# Patient Record
Sex: FEMALE | Race: WHITE | NOT HISPANIC OR LATINO | Employment: OTHER | URBAN - METROPOLITAN AREA
[De-identification: names, ages, dates, MRNs, and addresses within clinical notes are randomized per-mention and may not be internally consistent; named-entity substitution may affect disease eponyms.]

---

## 2024-04-24 ENCOUNTER — TELEPHONE (OUTPATIENT)
Age: 64
End: 2024-04-24

## 2024-04-24 NOTE — TELEPHONE ENCOUNTER
"Patient called today to establish care in [place] for bilateral kidney stones.     Patient states she was seen in ED in [facility]. Says that they are \"small and non obstructing\".    Pt asks if the office can reach out to her PCP Dr. Obrien tel number [phone]  to request records pertaining to kidney stones.    Please review for scheduling recommendations.    Patient states that she's experiencing pain for the last 2 weeks.     PCP gave her the ED precautions according to the patient.     Patient has a history of kidney stones.    Call back [phone]      "

## 2024-08-05 ENCOUNTER — OFFICE VISIT (OUTPATIENT)
Dept: FAMILY MEDICINE CLINIC | Facility: CLINIC | Age: 64
End: 2024-08-05
Payer: COMMERCIAL

## 2024-08-05 VITALS
HEIGHT: 69 IN | DIASTOLIC BLOOD PRESSURE: 80 MMHG | BODY MASS INDEX: 18.96 KG/M2 | HEART RATE: 72 BPM | OXYGEN SATURATION: 99 % | TEMPERATURE: 97.9 F | SYSTOLIC BLOOD PRESSURE: 134 MMHG | WEIGHT: 128 LBS

## 2024-08-05 DIAGNOSIS — G89.29 CHRONIC RIGHT-SIDED LOW BACK PAIN, UNSPECIFIED WHETHER SCIATICA PRESENT: ICD-10-CM

## 2024-08-05 DIAGNOSIS — F41.9 ANXIETY: ICD-10-CM

## 2024-08-05 DIAGNOSIS — M41.9 SCOLIOSIS OF LUMBOSACRAL SPINE, UNSPECIFIED SCOLIOSIS TYPE: ICD-10-CM

## 2024-08-05 DIAGNOSIS — K21.9 GASTROESOPHAGEAL REFLUX DISEASE WITHOUT ESOPHAGITIS: Primary | ICD-10-CM

## 2024-08-05 DIAGNOSIS — Z00.00 MEDICARE ANNUAL WELLNESS VISIT, SUBSEQUENT: ICD-10-CM

## 2024-08-05 DIAGNOSIS — M41.9 SCOLIOSIS OF THORACIC SPINE, UNSPECIFIED SCOLIOSIS TYPE: ICD-10-CM

## 2024-08-05 DIAGNOSIS — R92.30 DENSE BREAST TISSUE: ICD-10-CM

## 2024-08-05 DIAGNOSIS — Z78.0 ASYMPTOMATIC MENOPAUSE: ICD-10-CM

## 2024-08-05 DIAGNOSIS — Z13.220 SCREENING FOR LIPID DISORDERS: ICD-10-CM

## 2024-08-05 DIAGNOSIS — Z00.00 MEDICARE ANNUAL WELLNESS VISIT, INITIAL: ICD-10-CM

## 2024-08-05 DIAGNOSIS — N18.5 CHRONIC RENAL DISEASE, STAGE V (HCC): ICD-10-CM

## 2024-08-05 DIAGNOSIS — E55.9 VITAMIN D DEFICIENCY: ICD-10-CM

## 2024-08-05 DIAGNOSIS — K58.8 OTHER IRRITABLE BOWEL SYNDROME: ICD-10-CM

## 2024-08-05 DIAGNOSIS — M05.79 RHEUMATOID ARTHRITIS INVOLVING MULTIPLE SITES WITH POSITIVE RHEUMATOID FACTOR (HCC): ICD-10-CM

## 2024-08-05 DIAGNOSIS — R73.01 ELEVATED FASTING GLUCOSE: ICD-10-CM

## 2024-08-05 DIAGNOSIS — R53.82 CHRONIC FATIGUE: ICD-10-CM

## 2024-08-05 DIAGNOSIS — M54.50 CHRONIC RIGHT-SIDED LOW BACK PAIN, UNSPECIFIED WHETHER SCIATICA PRESENT: ICD-10-CM

## 2024-08-05 DIAGNOSIS — F17.200 SMOKER: ICD-10-CM

## 2024-08-05 DIAGNOSIS — K59.09 OTHER CONSTIPATION: ICD-10-CM

## 2024-08-05 PROBLEM — A60.09 HERPES GENITALIS IN WOMEN: Status: ACTIVE | Noted: 2020-03-02

## 2024-08-05 PROBLEM — N20.0 KIDNEY STONES: Status: ACTIVE | Noted: 2024-04-23

## 2024-08-05 PROBLEM — Z59.41 FOOD INSECURITY: Status: ACTIVE | Noted: 2024-05-13

## 2024-08-05 PROCEDURE — 99204 OFFICE O/P NEW MOD 45 MIN: CPT | Performed by: NURSE PRACTITIONER

## 2024-08-05 PROCEDURE — G0444 DEPRESSION SCREEN ANNUAL: HCPCS | Performed by: NURSE PRACTITIONER

## 2024-08-05 PROCEDURE — G0402 INITIAL PREVENTIVE EXAM: HCPCS | Performed by: NURSE PRACTITIONER

## 2024-08-05 NOTE — PROGRESS NOTES
Ambulatory Visit  Name: Joseph Mcbride      : 1960      MRN: [id number]  Encounter Provider: ROBER Cordero  Encounter Date: 2024   Encounter [facility]    Assessment & Plan   1. Gastroesophageal reflux disease without esophagitis  -     Comprehensive metabolic panel  -     CBC and differential  -     TSH, 3rd generation with Free T4 reflex  2. Chronic right-sided low back pain, unspecified whether sciatica present  -     Comprehensive metabolic panel  -     CBC and differential  -     TSH, 3rd generation with Free T4 reflex  -     Ambulatory referral to Spine & Pain Management; Future  -     Ambulatory Referral to Physical Therapy; Future  3. Anxiety  4. Medicare annual wellness visit, initial  5. Elevated fasting glucose  -     Lipid panel  -     UA/M w/rflx Culture, Routine  -     Hemoglobin A1C  6. Vitamin D deficiency  -     Vitamin D 25 hydroxy  7. Rheumatoid arthritis involving multiple sites with positive rheumatoid factor (HCC)  -     Ambulatory Referral to Physical Therapy; Future  8. Chronic renal disease, stage V (HCC)  -     Lipid panel  9. Screening for lipid disorders  -     Lipid panel  10. Chronic fatigue  -     TSH, 3rd generation with Free T4 reflex  11. Medicare annual wellness visit, subsequent  12. Dense breast tissue  -     Mammo diagnostic bilateral w cad; Future  13. Smoker  14. Asymptomatic menopause  -     DXA body comp analysis; Future; Expected date: 2024  15. Other irritable bowel syndrome  -     DXA body comp analysis; Future; Expected date: 2024  16. Other constipation  17. Scoliosis of lumbosacral spine, unspecified scoliosis type  -     Ambulatory Referral to Physical Therapy; Future  18. Scoliosis of thoracic spine, unspecified scoliosis type  -     Ambulatory Referral to Physical Therapy; Future      Depression Screening and Follow-up Plan: Patient was screened for depression during today's encounter. They  screened negative with a PHQ-2 score of 2.    Falls Plan of Care: balance, strength, and gait training instructions were provided. Recommended assistive device to help with gait and balance. Medications that increase falls were reviewed. Assessed feet and footwear. Vitamin D supplementation was recommended. Home safety education provided.     Tobacco Cessation Counseling: Tobacco cessation counseling was provided. The patient is sincerely urged to quit consumption of tobacco. She is not ready to quit tobacco. Medication options and side effects of medication discussed.       Preventive health issues were discussed with patient, and age appropriate screening tests were ordered as noted in patient's After Visit Summary. Personalized health advice and appropriate referrals for health education or preventive services given if needed, as noted in patient's After Visit Summary.    History of Present Illness     Pt is a 64 yr old female   Presents in office to establish care and follow up on multiple underlying issues   Also due for medicare wellness   Reviewed all diagnoses and medications   Patient verbalizes that she is very much a  and not much for medications   Would like to monitor underlying issues but in term of treatment would not like [facility] first unless really needed.   Discussed preventative medicine - not much for that either   Would like some labs ordered and will discuss further treatment options if needed   Does have back issues and scoliosis            Patient Care Team:  ROBER Cordero as PCP - General (Family Medicine)  Jarrett Campos DO (Gastroenterology)    Review of Systems   Constitutional:  Negative for fatigue, fever and unexpected weight change.   HENT:  Negative for congestion, rhinorrhea, sore throat and voice change.    Eyes: Negative.    Respiratory:  Negative for cough and shortness of breath.    Cardiovascular:  Negative for chest pain and palpitations.    Gastrointestinal:  Negative for abdominal distention, abdominal pain, nausea and vomiting.   Endocrine: Negative.    Genitourinary:  Negative for difficulty urinating and flank pain.   Musculoskeletal:  Positive for arthralgias, back pain and myalgias.        Scoliosis and upper back issues    Skin:  Negative for rash.   Allergic/Immunologic: Negative for environmental allergies.   Neurological:  Negative for headaches.   Hematological:  Negative for adenopathy.   Psychiatric/Behavioral:  Negative for sleep disturbance and suicidal ideas. The patient is not nervous/anxious.      Medical History Reviewed by provider this encounter:  Tobacco  Allergies  Meds  Problems  Med Hx  Surg Hx  Fam Hx       Annual Wellness Visit Questionnaire   Joseph is here for her Subsequent Wellness visit.     Health Risk Assessment:   Patient rates overall health as poor. Patient feels that their physical health rating is slightly worse. Patient is satisfied with their life. Eyesight was rated as slightly worse. Hearing was rated as same. Patient feels that their emotional and mental health rating is same. Patients states they are never, rarely angry. Patient states they are often unusually tired/fatigued. Pain experienced in the last 7 days has been a lot. Patient's pain rating has been 8/10. Patient states that she has experienced no weight loss or gain in last 6 months.     Depression Screening:   PHQ-2 Score: 2      Fall Risk Screening:   In the past year, patient has experienced: no history of falling in past year      Urinary Incontinence Screening:   Patient has leaked urine accidently in the last six months.     Home Safety:  Patient has trouble with stairs inside or outside of their home. Patient has working smoke alarms and has working carbon monoxide detector. Home safety hazards include: none.     Nutrition:   Current diet is Other (please comment). IBS causing restrictions to her eating. She does try to stay plantr  based     Medications:   Patient is not currently taking any over-the-counter supplements. Patient is able to manage medications.     Activities of Daily Living (ADLs)/Instrumental Activities of Daily Living (IADLs):   Walk and transfer into and out of bed and chair?: Yes  Dress and groom yourself?: Yes    Bathe or shower yourself?: Yes    Feed yourself? Yes  Do your laundry/housekeeping?: Yes  Manage your money, pay your bills and track your expenses?: Yes  Make your own meals?: Yes    Do your own shopping?: Yes    Previous Hospitalizations:   Any hospitalizations or ED visits within the last 12 months?: Yes    How many hospitalizations have you had in the last year?: 1-2    Advance Care Planning:   Living will: Yes    Advanced directive: Yes    Advanced directive counseling given: Yes    ACP document given: Yes      Cognitive Screening:   Provider or family/friend/caregiver concerned regarding cognition?: No    PREVENTIVE SCREENINGS      Cardiovascular Screening:    General: Risks and Benefits Discussed    Due for: Lipid Panel      Diabetes Screening:     General: Screening Current      Colorectal Cancer Screening:     General: Screening Current      Breast Cancer Screening:     General: Risks and Benefits Discussed    Due for: Mammogram        Cervical Cancer Screening:    General: Screening Not Indicated and Risks and Benefits Discussed      Osteoporosis Screening:    General: Risks and Benefits Discussed    Due for: Bone Density CT Axial      Lung Cancer Screening:     General: Screening Not Indicated    Screening, Brief Intervention, and Referral to Treatment (SBIRT)    Screening  Typical number of drinks in a day: 0  Typical number of drinks in a week: 0  Interpretation: Low risk drinking behavior.    Single Item Drug Screening:  How often have you used an illegal drug (including marijuana) or a prescription medication for non-medical reasons in the past year? never    Single Item Drug Screen Score:  0  Interpretation: Negative screen for possible drug use disorder    Time Spent  Time spent screening/evaluating the patient for alcohol misuse: 0 minutes. Time spent providing alcohol/substance abuse assessment and intervention services: 0 minutes.    Annual Depression Screening  Time spent screening and evaluating the patient for depression during today's encounter was 5 minutes.    SDOH Risk Assessment  Social determinants of health (SDOH) risk assesment tool was completed. The tool at a minimum covered housing stability, food insecurity, transportation needs, and utility difficulty. Patient had at risk responses for the following SDOH domains: housing stability.     Other Counseling Topics:   Car/seat belt/driving safety, skin self-exam, sunscreen and calcium and vitamin D intake and regular weightbearing exercise.     Social Determinants of Health     Financial Resource Strain: High Risk (4/22/2024)    Received from [employer]    Financial Resource Strain     Do you have any trouble paying for your medications, or do you think you might in the future?: Yes   Food Insecurity: Patient Declined (8/5/2024)    Hunger Vital Sign     Worried About Running Out of Food in the Last Year: Patient declined     Ran Out of Food in the Last Year: Patient declined   Transportation Needs: Patient Declined (8/5/2024)    PRAPARE - Transportation     Lack of Transportation (Medical): Patient declined     Lack of Transportation (Non-Medical): Patient declined   Housing Stability: Patient Declined (8/5/2024)    Housing Stability Vital Sign     Unable to Pay for Housing in the Last Year: Patient declined     Number of Times Moved in the Last Year: 2     Homeless in the Last Year: Patient declined   Utilities: Patient Declined (8/5/2024)    [facility] Utilities     Threatened with loss of utilities: Patient declined     No results found.    Objective     /80 (BP Location: Left arm, Patient Position: Sitting, Cuff Size: Adult)   Pulse  "72   Temp 97.9 °F (36.6 °C)   Ht 5' 9\" (1.753 m)   Wt 58.1 kg (128 lb)   SpO2 99%   BMI 18.90 kg/m²     Physical Exam  Vitals and nursing note reviewed.   Constitutional:       Appearance: Normal appearance.      Comments: BMI 18.90   Does have history of eating disorders at a young age    HENT:      Head: Atraumatic.      Nose: Nose normal.   Eyes:      Extraocular Movements: Extraocular movements intact.   Cardiovascular:      Rate and Rhythm: Normal rate and regular rhythm.      Pulses: Normal pulses.      Heart sounds: Normal heart sounds.   Pulmonary:      Effort: Pulmonary effort is normal.      Breath sounds: Normal breath sounds.   Abdominal:      Palpations: Abdomen is soft.   Musculoskeletal:      Cervical back: Normal range of motion.      Right lower leg: No edema.      Left lower leg: No edema.      Comments: Upper back scoliosis curvature    Skin:     Capillary Refill: Capillary refill takes less than 2 seconds.   Neurological:      Mental Status: She is alert and oriented to person, place, and time.   Psychiatric:         Mood and Affect: Mood normal.         Behavior: Behavior normal.         CT RENAL STONE STUDY ABDOMEN PELVIS WO CONTRAST  Order: [id number]  Impression    IMPRESSION  1. Multiple nonobstructing bilateral renal calculi.  2. No ureteral calculi identified.  3. No hydronephrosis.  Narrative    EXAM  EXAM: CT ABD/PELVIS WO IV/ORAL CONTRAST  DATE and TIME: 4/19/2024 8:06 am    HISTORY  Provided clinical history: \"bilateral flank pain, nausea, hx kidney stones\"    TECHNIQUE  CT scan of the abdomen and pelvis was performed without IV contrast, and coronal and sagittal reformats are provided.  Oral contrast was not administered.    COMPARISON  None.    FINDINGS  LOWER CHEST:    MEDIASTINUM: Unremarkable  LUNG BASES: Unremarkable    ABDOMEN/PELVIS:    LINES AND DEVICES: None  LIVER, BILE DUCTS, GALLBLADDER: Unremarkable  PANCREAS: Unremarkable  SPLEEN: Unremarkable  ADRENAL GLANDS: " Unremarkable  KIDNEYS/URETERS: Multiple nonobstructing bilateral renal calculi.  No ureteral calculi identified.  No hydronephrosis or significant perinephric edema.  BLADDER: Unremarkable  BOWEL: No bowel obstruction.  Moderate stool in the left hemicolon.  Appendix is not definitively visualized.  PERITONEUM/RETROPERITONEUM: Unremarkable  LYMPH NODES: Unremarkable  VESSELS: Atherosclerotic calcifications.  REPRODUCTIVE ORGANS: Retroverted uterus.    ABDOMINAL WALL/SOFT TISSUES: Unremarkable  BONES: Multilevel intervertebral disc degeneration and facet osteoarthritis.  Lumbar levoscoliosis, with mild left lateral listhesis of L4 on L5.  Mild bilateral hip and sacroiliac osteoarthritis.  Exam End: 04/19/24  8:06 AM    Specimen Collected: 04/19/24  8:22 AM Last Resulted: 04/19/24  8:20 AM   Received From: [facility]  Result Received: 04/25/24  7:18 AM

## 2024-08-07 ENCOUNTER — TELEPHONE (OUTPATIENT)
Dept: MAMMOGRAPHY | Facility: CLINIC | Age: 64
End: 2024-08-07

## 2024-08-12 ENCOUNTER — OFFICE VISIT (OUTPATIENT)
Dept: GASTROENTEROLOGY | Facility: CLINIC | Age: 64
End: 2024-08-12
Payer: COMMERCIAL

## 2024-08-12 ENCOUNTER — APPOINTMENT (OUTPATIENT)
Dept: LAB | Facility: CLINIC | Age: 64
End: 2024-08-12
Payer: COMMERCIAL

## 2024-08-12 VITALS
HEART RATE: 90 BPM | HEIGHT: 69 IN | BODY MASS INDEX: 18.81 KG/M2 | OXYGEN SATURATION: 99 % | TEMPERATURE: 97.6 F | DIASTOLIC BLOOD PRESSURE: 76 MMHG | SYSTOLIC BLOOD PRESSURE: 120 MMHG | WEIGHT: 127 LBS

## 2024-08-12 DIAGNOSIS — R73.01 ELEVATED FASTING BLOOD SUGAR: Primary | ICD-10-CM

## 2024-08-12 DIAGNOSIS — K57.90 DIVERTICULAR DISEASE: ICD-10-CM

## 2024-08-12 DIAGNOSIS — R14.0 BLOATING: ICD-10-CM

## 2024-08-12 DIAGNOSIS — K58.2 IRRITABLE BOWEL SYNDROME WITH BOTH CONSTIPATION AND DIARRHEA: Primary | ICD-10-CM

## 2024-08-12 LAB
25(OH)D3 SERPL-MCNC: 41 NG/ML (ref 30–100)
ALBUMIN SERPL BCG-MCNC: 4.3 G/DL (ref 3.5–5)
ALP SERPL-CCNC: 81 U/L (ref 34–104)
ALT SERPL W P-5'-P-CCNC: 15 U/L (ref 7–52)
ANION GAP SERPL CALCULATED.3IONS-SCNC: 7 MMOL/L (ref 4–13)
AST SERPL W P-5'-P-CCNC: 20 U/L (ref 13–39)
BACTERIA UR QL AUTO: NORMAL /HPF
BASOPHILS # BLD AUTO: 0.05 THOUSANDS/ÂΜL (ref 0–0.1)
BASOPHILS NFR BLD AUTO: 1 % (ref 0–1)
BILIRUB SERPL-MCNC: 0.44 MG/DL (ref 0.2–1)
BILIRUB UR QL STRIP: NEGATIVE
BUN SERPL-MCNC: 15 MG/DL (ref 5–25)
CALCIUM SERPL-MCNC: 9.7 MG/DL (ref 8.4–10.2)
CHLORIDE SERPL-SCNC: 103 MMOL/L (ref 96–108)
CHOLEST SERPL-MCNC: 186 MG/DL
CLARITY UR: CLEAR
CO2 SERPL-SCNC: 30 MMOL/L (ref 21–32)
COLOR UR: ABNORMAL
CREAT SERPL-MCNC: 0.63 MG/DL (ref 0.6–1.3)
EOSINOPHIL # BLD AUTO: 0.22 THOUSAND/ÂΜL (ref 0–0.61)
EOSINOPHIL NFR BLD AUTO: 3 % (ref 0–6)
ERYTHROCYTE [DISTWIDTH] IN BLOOD BY AUTOMATED COUNT: 15 % (ref 11.6–15.1)
EST. AVERAGE GLUCOSE BLD GHB EST-MCNC: 117 MG/DL
GFR SERPL CREATININE-BSD FRML MDRD: 94 ML/MIN/1.73SQ M
GLUCOSE P FAST SERPL-MCNC: 85 MG/DL (ref 65–99)
GLUCOSE UR STRIP-MCNC: NEGATIVE MG/DL
HBA1C MFR BLD: 5.7 %
HCT VFR BLD AUTO: 45.9 % (ref 34.8–46.1)
HDLC SERPL-MCNC: 58 MG/DL
HGB BLD-MCNC: 14.4 G/DL (ref 11.5–15.4)
HGB UR QL STRIP.AUTO: ABNORMAL
IMM GRANULOCYTES # BLD AUTO: 0.04 THOUSAND/UL (ref 0–0.2)
IMM GRANULOCYTES NFR BLD AUTO: 1 % (ref 0–2)
KETONES UR STRIP-MCNC: NEGATIVE MG/DL
LDLC SERPL CALC-MCNC: 109 MG/DL (ref 0–100)
LEUKOCYTE ESTERASE UR QL STRIP: NEGATIVE
LYMPHOCYTES # BLD AUTO: 2.76 THOUSANDS/ÂΜL (ref 0.6–4.47)
LYMPHOCYTES NFR BLD AUTO: 32 % (ref 14–44)
MCH RBC QN AUTO: 27.5 PG (ref 26.8–34.3)
MCHC RBC AUTO-ENTMCNC: 31.4 G/DL (ref 31.4–37.4)
MCV RBC AUTO: 88 FL (ref 82–98)
MONOCYTES # BLD AUTO: 0.52 THOUSAND/ÂΜL (ref 0.17–1.22)
MONOCYTES NFR BLD AUTO: 6 % (ref 4–12)
NEUTROPHILS # BLD AUTO: 5.16 THOUSANDS/ÂΜL (ref 1.85–7.62)
NEUTS SEG NFR BLD AUTO: 57 % (ref 43–75)
NITRITE UR QL STRIP: NEGATIVE
NON-SQ EPI CELLS URNS QL MICRO: NORMAL /HPF
NONHDLC SERPL-MCNC: 128 MG/DL
NRBC BLD AUTO-RTO: 0 /100 WBCS
PH UR STRIP.AUTO: 6.5 [PH]
PLATELET # BLD AUTO: 258 THOUSANDS/UL (ref 149–390)
PMV BLD AUTO: 11.8 FL (ref 8.9–12.7)
POTASSIUM SERPL-SCNC: 4.5 MMOL/L (ref 3.5–5.3)
PROT SERPL-MCNC: 7.4 G/DL (ref 6.4–8.4)
PROT UR STRIP-MCNC: NEGATIVE MG/DL
RBC # BLD AUTO: 5.24 MILLION/UL (ref 3.81–5.12)
RBC #/AREA URNS AUTO: NORMAL /HPF
SODIUM SERPL-SCNC: 140 MMOL/L (ref 135–147)
SP GR UR STRIP.AUTO: 1.01 (ref 1–1.03)
TRIGL SERPL-MCNC: 93 MG/DL
TSH SERPL DL<=0.05 MIU/L-ACNC: 1.92 UIU/ML (ref 0.45–4.5)
UROBILINOGEN UR STRIP-ACNC: <2 MG/DL
WBC # BLD AUTO: 8.75 THOUSAND/UL (ref 4.31–10.16)
WBC #/AREA URNS AUTO: NORMAL /HPF

## 2024-08-12 PROCEDURE — 83036 HEMOGLOBIN GLYCOSYLATED A1C: CPT | Performed by: NURSE PRACTITIONER

## 2024-08-12 PROCEDURE — 80053 COMPREHEN METABOLIC PANEL: CPT | Performed by: NURSE PRACTITIONER

## 2024-08-12 PROCEDURE — 85025 COMPLETE CBC W/AUTO DIFF WBC: CPT | Performed by: NURSE PRACTITIONER

## 2024-08-12 PROCEDURE — 82306 VITAMIN D 25 HYDROXY: CPT | Performed by: NURSE PRACTITIONER

## 2024-08-12 PROCEDURE — 99203 OFFICE O/P NEW LOW 30 MIN: CPT | Performed by: INTERNAL MEDICINE

## 2024-08-12 PROCEDURE — 36415 COLL VENOUS BLD VENIPUNCTURE: CPT | Performed by: NURSE PRACTITIONER

## 2024-08-12 PROCEDURE — 80061 LIPID PANEL: CPT | Performed by: NURSE PRACTITIONER

## 2024-08-12 PROCEDURE — 81001 URINALYSIS AUTO W/SCOPE: CPT

## 2024-08-12 PROCEDURE — 84443 ASSAY THYROID STIM HORMONE: CPT | Performed by: NURSE PRACTITIONER

## 2024-08-13 NOTE — PROGRESS NOTES
[facility] - Outpatient Consultation  Joseph Mcbride 64 y.o. female MRN: [id number]  Encounter: [id number]          ASSESSMENT AND PLAN:      1. Irritable bowel syndrome with both constipation and diarrhea  -Continue with Florastor as prescribed, 1 tablet daily  -The low FODMAP diet was provided to the patient and discussed in detail.  -Patient states that she would like to move all routine prescription Medications  -I have strongly advocated the importance of repeat colonoscopy.  -The Cologuard test was never meant to be replacement for colonoscopy    2. Bloating  -High-fiber diet to include fruits, vegetable, and whole-grain foods.    3. Diverticular disease  -High-fiber diet to include fruits, vegetables, and whole grain foods, daily    ______________________________________________________________________    HPI: Nito is a 64-year-old female who comes the office today for evaluation of ongoing abdominal issues to include bloating especially when she eats certain kinds of foods, abdominal pain which is described as generalized and also associated with eating certain kinds of foods.  For example, chocolates aggravate her symptoms predictably.  She is experiencing a bowel movement every morning that generally is formed.  She denies any current diarrhea or constipation.  She does admit to gaseousness especially with certain kinds of foods that she eats.  She states that she does not believe in prescription medications and would like to avoid these altogether.  She states that she has tried over-the-counter medications for gaseousness and bloating without any relief.  She has had a history of diverticular disease in the past with previous episode of diverticulitis.  She is currently taking Florastor 1 tablet daily.  Her last colonoscopy was 10 years ago.  She has a maternal aunt with colon cancer as well as colon polyps.  She states that she underwent a Cologuard test this past year  which was negative.      REVIEW OF SYSTEMS:    CONSTITUTIONAL: Denies any fever, chills, rigors, and weight loss.  HEENT: No earache or tinnitus. Denies hearing loss or visual disturbances.  CARDIOVASCULAR: No chest pain or palpitations.   RESPIRATORY: Denies any cough, hemoptysis, shortness of breath or dyspnea on exertion.  GASTROINTESTINAL: As noted in the History of Present Illness.   GENITOURINARY: No problems with urination. Denies any hematuria or dysuria.  NEUROLOGIC: No dizziness or vertigo, denies headaches.   MUSCULOSKELETAL: Denies any muscle or joint pain.   SKIN: Denies skin rashes or itching.   ENDOCRINE: Denies excessive thirst. Denies intolerance to heat or cold.  PSYCHOSOCIAL: Denies depression or anxiety. Denies any recent memory loss.       Historical Information   Past Medical History:   Diagnosis Date    Anxiety     Diverticulitis     IBS (irritable bowel syndrome)     Kidney stones      Past Surgical History:   Procedure Laterality Date    LITHOTRIPSY      THERAPEUTIC        Social History   Social History     Substance and Sexual Activity   Alcohol Use Never     Social History     Substance and Sexual Activity   Drug Use Never     Social History     Tobacco Use   Smoking Status Every Day    Current packs/day: 0.25    Average packs/day: 0.3 packs/day for 40.6 years (10.2 ttl pk-yrs)    Types: Cigarettes    Start date:    Smokeless Tobacco Never     Family History   Problem Relation Age of Onset    Hypertension Mother     No Known Problems Father     Glaucoma Maternal Grandmother     Diabetes Maternal Grandmother     Breast cancer Maternal Aunt        Meds/Allergies       Current Outpatient Medications:     ibuprofen (MOTRIN) 800 mg tablet    saccharomyces boulardii (FLORASTOR) 250 mg capsule    cyclobenzaprine (FLEXERIL) 10 mg tablet    ondansetron (ZOFRAN-ODT) 8 mg disintegrating tablet    simethicone (MYLICON) 80 mg chewable tablet    Allergies   Allergen Reactions    Codeine  "Confusion     Agitation           Objective     Blood pressure 120/76, pulse 90, temperature 97.6 °F (36.4 °C), temperature source Tympanic, height 5' 9\" (1.753 m), weight 57.6 kg (127 lb), SpO2 99%. Body mass index is 18.75 kg/m².        PHYSICAL EXAM:      General Appearance:   Alert, cooperative, no distress   HEENT:   Normocephalic, atraumatic, anicteric.     Neck:  Supple, symmetrical, trachea midline   Lungs:   Clear to auscultation bilaterally; no rales, rhonchi or wheezing; respirations unlabored    Heart::   Regular rate and rhythm; no murmur, rub, or gallop.   Abdomen:   Soft, non-tender, non-distended; normal bowel sounds; no masses, no organomegaly    Genitalia:   Deferred    Rectal:   Deferred    Extremities:  No cyanosis, clubbing or edema    Pulses:  2+ and symmetric    Skin:  No jaundice, rashes, or lesions    Lymph nodes:  No palpable cervical lymphadenopathy        Lab Results:   Appointment on 08/12/2024   Component Date Value    Color, UA 08/12/2024 Light Yellow     Clarity, UA 08/12/2024 Clear     Specific Gravity, UA 08/12/2024 1.008     pH, UA 08/12/2024 6.5     Leukocytes, UA 08/12/2024 Negative     Nitrite, UA 08/12/2024 Negative     Protein, UA 08/12/2024 Negative     Glucose, UA 08/12/2024 Negative     Ketones, UA 08/12/2024 Negative     Urobilinogen, UA 08/12/2024 <2.0     Bilirubin, UA 08/12/2024 Negative     Occult Blood, UA 08/12/2024 Trace (A)     RBC, UA 08/12/2024 None Seen     WBC, UA 08/12/2024 1-2     Epithelial Cells 08/12/2024 Occasional     Bacteria, UA 08/12/2024 None Seen          Radiology Results:   No results found.  "

## 2024-08-26 ENCOUNTER — TELEPHONE (OUTPATIENT)
Age: 64
End: 2024-08-26

## 2024-08-26 ENCOUNTER — OFFICE VISIT (OUTPATIENT)
Dept: FAMILY MEDICINE CLINIC | Facility: CLINIC | Age: 64
End: 2024-08-26
Payer: COMMERCIAL

## 2024-08-26 VITALS
BODY MASS INDEX: 18.66 KG/M2 | HEIGHT: 69 IN | HEART RATE: 79 BPM | WEIGHT: 126 LBS | TEMPERATURE: 97 F | OXYGEN SATURATION: 99 % | SYSTOLIC BLOOD PRESSURE: 124 MMHG | DIASTOLIC BLOOD PRESSURE: 70 MMHG

## 2024-08-26 DIAGNOSIS — R63.6 UNDERWEIGHT: ICD-10-CM

## 2024-08-26 DIAGNOSIS — E44.1 MILD PROTEIN-CALORIE MALNUTRITION (HCC): ICD-10-CM

## 2024-08-26 DIAGNOSIS — K04.7 DENTAL ABSCESS: Primary | ICD-10-CM

## 2024-08-26 PROCEDURE — 99214 OFFICE O/P EST MOD 30 MIN: CPT | Performed by: NURSE PRACTITIONER

## 2024-08-26 PROCEDURE — G2211 COMPLEX E/M VISIT ADD ON: HCPCS | Performed by: NURSE PRACTITIONER

## 2024-08-26 RX ORDER — CHLORHEXIDINE GLUCONATE ORAL RINSE 1.2 MG/ML
15 SOLUTION DENTAL 2 TIMES DAILY
Qty: 120 ML | Refills: 0 | Status: SHIPPED | OUTPATIENT
Start: 2024-08-26 | End: 2024-08-31

## 2024-08-26 RX ORDER — CLINDAMYCIN HYDROCHLORIDE 75 MG/1
75 CAPSULE ORAL 4 TIMES DAILY
Qty: 28 CAPSULE | Refills: 0 | Status: SHIPPED | OUTPATIENT
Start: 2024-08-26 | End: 2024-09-02

## 2024-08-26 NOTE — TELEPHONE ENCOUNTER
Patient is asking if the Clindamycin will cause her any stomach issues?  Please contact patient and advise.  Thank you.

## 2024-08-26 NOTE — PROGRESS NOTES
Ambulatory Visit  Name: Joseph Mcbride      : 1960      MRN: [id number]  Encounter Provider: ROBER Cordero  Encounter Date: 2024   Encounter department: [facility]  Pt is a 64 yr old female   Presents in office for dental pain and left facial swelling   She has a broken tooth and developed and abscess.   She has not had a dentist in the area - our office called multiple dentists in the area and they don't take her insurance   [facility] will see her  appt set for 1130 am today   Assessment & Plan   1. Dental abscess  -     clindamycin (CLEOCIN) 75 MG capsule; Take 1 capsule (75 mg total) by mouth 4 (four) times a day for 7 days  -     chlorhexidine (PERIDEX) 0.12 % solution; Apply 15 mL to the mouth or throat 2 (two) times a day for 5 days  -     Ambulatory Referral to Dentistry; Future  2. Mild protein-calorie malnutrition (HCC)  3. Underweight  Comments:  pt prefers it that way   decerased dosage of abx based on weight       History of Present Illness     Pt is a 64 yr old female   Presents in office for dental pain and left facial swelling   She has a broken tooth and developed and abscess.   She has not had a dentist in the area - our office called multiple dentists in the area and they don't take her insurance   [facility] will see her   And also sent message to our [facility] clinic to see if she can be seen.         Review of Systems   Constitutional:  Positive for fatigue. Negative for fever and unexpected weight change.   HENT:  Positive for congestion, dental problem and postnasal drip.         Left facial swelling / abscess    Eyes:         Left eye swelling    Respiratory:  Negative for cough and shortness of breath.    Cardiovascular:  Negative for chest pain and palpitations.   Gastrointestinal:  Negative for abdominal distention, abdominal pain, nausea and vomiting.   Genitourinary:  Negative for difficulty urinating and flank pain.   Skin:  Negative  for rash.   Neurological:  Positive for headaches.   Hematological:  Positive for adenopathy (left sided neck swelling - left dental abcess).   Psychiatric/Behavioral:  Negative for self-injury and suicidal ideas. The patient is nervous/anxious.      Pertinent Medical History   Reviewed       Medical History Reviewed by provider this encounter:  Tobacco  Allergies  Meds  Problems  Med Hx  Surg Hx  Fam Hx       Past Medical History   Past Medical History:   Diagnosis Date    Anxiety     Diverticulitis     IBS (irritable bowel syndrome)     Kidney stones      Past Surgical History:   Procedure Laterality Date    LITHOTRIPSY      THERAPEUTIC        Family History   Problem Relation Age of Onset    Hypertension Mother     No Known Problems Father     Glaucoma Maternal Grandmother     Diabetes Maternal Grandmother     Breast cancer Maternal Aunt      Current Outpatient Medications on File Prior to Visit   Medication Sig Dispense Refill    ibuprofen (MOTRIN) 800 mg tablet prn      saccharomyces boulardii (FLORASTOR) 250 mg capsule Take 250 mg by mouth 2 (two) times a day      cyclobenzaprine (FLEXERIL) 10 mg tablet  (Patient not taking: Reported on 2024)      ondansetron (ZOFRAN-ODT) 8 mg disintegrating tablet DISSOLVE 1 TABLET ON TONGUE EVERY 8 HOURS AS NEEDED FOR NAUSEA AND VOMITING (Patient not taking: Reported on 2024)      simethicone (MYLICON) 80 mg chewable tablet Chew 80 mg (Patient not taking: Reported on 2024)       No current facility-administered medications on file prior to visit.     Allergies   Allergen Reactions    Codeine Confusion     Agitation      Current Outpatient Medications on File Prior to Visit   Medication Sig Dispense Refill    ibuprofen (MOTRIN) 800 mg tablet prn      saccharomyces boulardii (FLORASTOR) 250 mg capsule Take 250 mg by mouth 2 (two) times a day      cyclobenzaprine (FLEXERIL) 10 mg tablet  (Patient not taking: Reported on 2024)      ondansetron  "(ZOFRAN-ODT) 8 mg disintegrating tablet DISSOLVE 1 TABLET ON TONGUE EVERY 8 HOURS AS NEEDED FOR NAUSEA AND VOMITING (Patient not taking: Reported on 8/5/2024)      simethicone (MYLICON) 80 mg chewable tablet Chew 80 mg (Patient not taking: Reported on 8/5/2024)       No current facility-administered medications on file prior to visit.      Social History     Tobacco Use    Smoking status: Every Day     Current packs/day: 0.25     Average packs/day: 0.2 packs/day for 40.7 years (10.2 ttl pk-yrs)     Types: Cigarettes     Start date: 1984    Smokeless tobacco: Never   Vaping Use    Vaping status: Never Used   Substance and Sexual Activity    Alcohol use: Never    Drug use: Never    Sexual activity: Not on file     Objective     /70 (BP Location: Left arm, Patient Position: Sitting, Cuff Size: Adult)   Pulse 79   Temp (!) 97 °F (36.1 °C)   Ht 5' 9\" (1.753 m)   Wt 57.2 kg (126 lb)   SpO2 99%   BMI 18.61 kg/m²     Physical Exam  Vitals and nursing note reviewed.   Constitutional:       Appearance: Normal appearance.   HENT:      Head: Atraumatic.      Nose: Congestion and rhinorrhea present.      Comments: Left neck swelling   Left facial abscess     Cardiovascular:      Rate and Rhythm: Normal rate and regular rhythm.   Pulmonary:      Breath sounds: Normal breath sounds.   Abdominal:      Palpations: Abdomen is soft.   Musculoskeletal:      Right lower leg: No edema.      Left lower leg: No edema.   Skin:     General: Skin is warm.   Neurological:      Mental Status: She is alert and oriented to person, place, and time.   Psychiatric:         Mood and Affect: Mood normal.         Behavior: Behavior normal.       Administrative Statements   I have spent a total time of 45  minutes in caring for this patient on the day of the visit/encounter including Prognosis, Risks and benefits of tx options, Instructions for management, Patient and family education, Importance of tx compliance, Risk factor reductions, " Impressions, Counseling / Coordination of care, Documenting in the medical record, Reviewing / ordering tests, medicine, procedures  , Obtaining or reviewing history  , Communicating with other healthcare professionals , and IF WORSENING OF SYMPTOMS AND FEVER DESPITE TREATMENT ER APPT SET WITH [facility] FOR TODAY 1130 am  .

## 2024-08-26 NOTE — PATIENT INSTRUCTIONS
DENTAL CLINIC     Appointments  Get quote  At: [place] [address]  [address] [place] [address], [place] [address] [address]  [phone]  [email or web address]

## 2024-08-26 NOTE — TELEPHONE ENCOUNTER
Left message for pt   Yes all antibiotics can cause GI distress  even though I ordered a lower dose of the clindamycin   Take with food  - add yogurt or probiotics and follow up with Dentist     I left a message for pt

## 2024-08-27 ENCOUNTER — TELEPHONE (OUTPATIENT)
Age: 64
End: 2024-08-27

## 2024-08-27 DIAGNOSIS — K04.7 DENTAL ABSCESS: Primary | ICD-10-CM

## 2024-08-27 NOTE — TELEPHONE ENCOUNTER
Patient called, she wanted to let Rukhsana know the swelling is starting to go down today.     She also went to [facility], they told the patient she needed to see a oral surgeon.  She spoke to her insurance and let her know that [facility] is in network for her.     She is asking if a referral could be sent to them. So she can call and make an appointment.      [facility]  [address] ,   [place] [address]    [phone]    Please advise, thank you.

## 2024-08-27 NOTE — TELEPHONE ENCOUNTER
Referral placed  LMOM for [facility] to receive a call back to get the fax number to send over this referral

## 2024-08-27 NOTE — TELEPHONE ENCOUNTER
Patient called to advise Rukhsana Cardenas of her dentist appt today.     They took x-rays and was told she needs an oral surgeon to remove the molar which is in the sinus cavity.     Patient will also need a referral for Oral Surgery.

## 2024-08-28 NOTE — TELEPHONE ENCOUNTER
Called  [facility] again today. Received fax number of [phone]  Faxed referral over. Called and LMOM for patient advising update on this

## 2024-09-03 ENCOUNTER — TELEPHONE (OUTPATIENT)
Age: 64
End: 2024-09-03

## 2024-09-03 DIAGNOSIS — K04.7 DENTAL ABSCESS: ICD-10-CM

## 2024-09-03 NOTE — TELEPHONE ENCOUNTER
Dentist  should be able to manage this  She was see by [facility]  But if you can let me know who she is supposed to be seeing I will try and call

## 2024-09-03 NOTE — TELEPHONE ENCOUNTER
Patient states oral doctor can not get her in before 10/24. She is wondering if Vera can help get this appointment moved up as her face is still swollen and infection has not cleared. Patient requests call back to advise.

## 2024-09-03 NOTE — TELEPHONE ENCOUNTER
LMOM advising patient   She can call back with info to the dental office doing procedure and we can try calling

## 2024-09-03 NOTE — TELEPHONE ENCOUNTER
Reason for call:   [x] Refill   [] Prior Auth  [x] Other: Pt called states she needs more medication, still has tooth pain and lots of tightness in her jaw. Pt is willing to come in if needed. She did call the oral surgeon and left message but they havnt called back. Please call pt at [phone].     Office:   [x] PCP/Provider -   [] Specialty/Provider -     Medication:     clindamycin (CLEOCIN) 75 MG capsule () 75 mg, 4 times daily       Pharmacy: [facility]/pharmacy #[id number] - [place] - [phone]      Does the patient have enough for 3 days?   [] Yes   [x] No - Send as HP to POD

## 2024-09-04 RX ORDER — CHLORHEXIDINE GLUCONATE ORAL RINSE 1.2 MG/ML
SOLUTION DENTAL
Qty: 473 ML | Refills: 1 | Status: SHIPPED | OUTPATIENT
Start: 2024-09-04

## 2024-09-06 NOTE — PROGRESS NOTES
Assessment:  1. Lumbar radiculopathy    2. Chronic right-sided low back pain, unspecified whether sciatica present    3. Lumbar facet arthropathy    4. Myofascial pain syndrome        Plan:  Orders Placed This Encounter   Procedures    Ambulatory referral to Physical Therapy     Standing Status:   Future     Standing Expiration Date:   9/9/2025     Referral Priority:   Routine     Referral Type:   Physical Therapy     Referral Reason:   Specialty Services Required     Requested Specialty:   Physical Therapy     Number of Visits Requested:   1     Expiration Date:   9/9/2025       New Medications Ordered This Visit   Medications    clindamycin (CLEOCIN) 300 MG capsule     Sig: Take 300 mg by mouth every 6 (six) hours       My impressions and treatment recommendations were discussed in detail with the patient, who verbalized understanding and had no further questions.    64F with bilateral lower back pain with radiation down left lower extremity in L5 dermatome. Most of her lower back symptoms are axial in nature with noted pain with lumbar extension.  Imaging is notable for multilevel facet disease as well as advanced foraminal narrowing at the left L5-S1 level.    We discussed injection treatments to help with her pain, however if she is apprehensive about needles and we will hold off for now.  She may benefit from a course of aquatic therapy and referral was provided to the patient today.  Return in 6 weeks or sooner if medically necessary.      [place] Prescription Drug Monitoring Program report was reviewed and was appropriate     Complete risks and benefits including bleeding, infection, tissue reaction, nerve injury and allergic reaction were discussed. The approach was demonstrated using models and literature was provided. Verbal and written consent was obtained.    Discharge instructions were provided. I personally saw and examined the patient and I agree with the above discussed plan of care.    History  of Present Illness:    Joseph Mcbride is a 64 y.o. female who presents to [facility] for initial evaluation of the above stated pain complaints. The patient has a past medical and chronic pain history as outlined in the assessment section. She was referred by ROBER Cordero  [address] [place] [address]  [place] [address] .    Patient is here with chief complaint of lower back pain since injury in .  Pain is moderate to severe over the past 1.  7-8 out of 10.  Nearly constant.  Sharp, pressure-like, throbbing, dull/aching with pins-and-needles in the feet.  Denies bowel bladder incontinence or saddle anesthesia.  Reports subjective weakness of upper and lower extremities.    The pain is increased with lying down, bending, sitting.  Decreased with exercise and relaxation.    Reports moderate leaf with PT, exercise.  Excellent relief with heat/ice therapy.    Pain is bilateral low back with radiation down left leg, left lateral calf and intot he foot.       Review of Systems:    Review of Systems   Gastrointestinal:  Positive for abdominal pain.   Musculoskeletal:  Positive for arthralgias and myalgias.           Past Medical History:   Diagnosis Date    Anxiety     Diverticulitis     IBS (irritable bowel syndrome)     Kidney stones        Past Surgical History:   Procedure Laterality Date    LITHOTRIPSY      THERAPEUTIC          Family History   Problem Relation Age of Onset    Hypertension Mother     No Known Problems Father     Glaucoma Maternal Grandmother     Diabetes Maternal Grandmother     Breast cancer Maternal Aunt        Social History     Occupational History    Not on file   Tobacco Use    Smoking status: Every Day     Current packs/day: 0.25     Average packs/day: 0.3 packs/day for 40.7 years (10.2 ttl pk-yrs)     Types: Cigarettes     Start date:     Smokeless tobacco: Never   Vaping Use    Vaping status: Never Used   Substance and Sexual Activity    Alcohol use:  "Never    Drug use: Never    Sexual activity: Not on file         Current Outpatient Medications:     chlorhexidine (PERIDEX) 0.12 % solution, APPLY 15 ML TO THE MOUTH OR THROAT 2 (TWO) TIMES A DAY FOR 5 DAYS. SPIT OUT. DO NOT SWALLOW, Disp: 473 mL, Rfl: 1    clindamycin (CLEOCIN) 300 MG capsule, Take 300 mg by mouth every 6 (six) hours, Disp: , Rfl:     ibuprofen (MOTRIN) 800 mg tablet, prn, Disp: , Rfl:     saccharomyces boulardii (FLORASTOR) 250 mg capsule, Take 250 mg by mouth 2 (two) times a day, Disp: , Rfl:     cyclobenzaprine (FLEXERIL) 10 mg tablet, , Disp: , Rfl:     ondansetron (ZOFRAN-ODT) 8 mg disintegrating tablet, DISSOLVE 1 TABLET ON TONGUE EVERY 8 HOURS AS NEEDED FOR NAUSEA AND VOMITING (Patient not taking: Reported on 8/5/2024), Disp: , Rfl:     simethicone (MYLICON) 80 mg chewable tablet, Chew 80 mg (Patient not taking: Reported on 8/5/2024), Disp: , Rfl:     Allergies   Allergen Reactions    Codeine Confusion     Agitation       Physical Exam:    /83   Pulse 82   Ht 5' 9\" (1.753 m)   Wt 57.2 kg (126 lb)   BMI 18.61 kg/m²     Constitutional: normal, well developed, well nourished, alert, in no distress and non-toxic and no overt pain behavior.  Eyes: anicteric  HEENT: grossly intact  Neck: supple, symmetric, trachea midline and no masses   Pulmonary:even and unlabored  Cardiovascular:No edema or pitting edema present  Skin:Normal without rashes or lesions and well hydrated  Psychiatric:Mood and affect appropriate  Neurologic:Cranial Nerves II-XII grossly intact  Musculoskeletal:normal    Lumbar Spine Exam    Appearance:  Normal lordosis  Palpation/Tenderness:  TTP bilateral lumbar paraspinal region  Sensory:  no sensory deficits noted  Range of Motion:  Notable pain with lumbar extension  Motor Strength:  Left hip flexion:  5/5  Left hip extension:  5/5  Right hip flexion:  5/5  Right hip extension:  5/5  Left knee flexion:  5/5  Left knee extension:  5/5  Right knee flexion:  5/5  Right " knee extension:  5/5  Left foot dorsiflexion:  5/5  Left foot plantar flexion:  5/5  Right foot dorsiflexion:  5/5  Right foot plantar flexion:  5/5  Reflexes:  Left Patellar:  3+   Right Patellar:  3+   Left Achilles:  3+   Right Achilles:  3+     Imaging    XR Lumbosacral Spine   8/29/22  Exam date and time: 8/29/2022 11:17 AM   Age: 62 years old   Clinical indication: Other intervertebral disc degeneration, lumbar region;   Additional info: Pain     TECHNIQUE:   Imaging protocol: Radiologic exam of the lumbosacral spine.   Views: 2 or 3 views.     COMPARISON:   MRI L SPINE WO CONTRAST 3/10/2021 2:24 PM     FINDINGS:   Bones/joints: There is significant levoscoliosis with coronal translation at   L4-L5. There is exaggeration of the normal lumbar lordosis without compression   fracture. There is no significant subluxation. There is moderate disc space   narrowing at L4-L5 and L5-S1. There is mild spur formation. Facet arthropathy   is present at multiple levels, most prominent at L5. There is generalized   decreased bone density.   Soft tissues: Unremarkable.   IMPRESSION:   1. There is significant levoscoliosis with coronal translation at L4-L5.   2. Exaggeration of the normal lumbar lordosis without compression fracture or   subluxation.   3. Degenerative disc changes and hypertrophic changes are present.   4. There is no acute osseous abnormality.     MRI L SPINE WO CONTRAST   3/10/2021     COMPARISON   Plain films 11/06/2020     TECHNIQUE   Sagittal T1 weighted, T2 weighted, and STIR scans, and axial T1 and T2 weighted scans of the lumbar spine were obtained.  Some of the scans were repeated due to motion.     FINDINGS   Counting from above, there are 5 lumbar type vertebrae.  There is approximately 29 degrees of levoscoliosis with the apex at L3.  There is no spondylolisthesis.  Vertebral body heights and disc spaces are fairly well preserved with mild narrowing of the L5-S1 disc.  Concave depression of the  superior endplate of L4 with some adjacent edema consistent with a relatively acute compression fracture.  Bone marrow signal intensity is otherwise within normal limits.     The conus medullaris terminates at the level of L1-2.  No intraspinal or paraspinal masses are seen.  The cauda equina is unremarkable.  Nerve root sleeve cyst noted at S2.     T12-L1: No significant abnormality.     L1-2: No significant abnormality.     L2-3: Mild facet hypertrophy.  Fluid in the right facet joint.  No significant abnormality.     L3-4: Mild disc bulge and facet hypertrophy.  Fluid in the right facet joint.     L4-5: Bilateral facet hypertrophy, diffuse disc bulge with mild central canal stenosis.  Mild foraminal stenosis bilaterally.     L5-S1: Bilateral facet hypertrophy, and diffuse disc bulge with mild central canal stenosis.  There is severe narrowing of the left neural foramen and mild narrowing of the right foramen.     IMPRESSION   Degenerative changes with scoliosis and facet hypertrophy as described.   There is severe stenosis of the left L5-S1 neural foramen.   No other significant stenosis appreciated.     No orders to display       Orders Placed This Encounter   Procedures    Ambulatory referral to Physical Therapy

## 2024-09-09 ENCOUNTER — CONSULT (OUTPATIENT)
Dept: PAIN MEDICINE | Facility: CLINIC | Age: 64
End: 2024-09-09
Payer: COMMERCIAL

## 2024-09-09 VITALS
HEIGHT: 69 IN | DIASTOLIC BLOOD PRESSURE: 83 MMHG | HEART RATE: 82 BPM | BODY MASS INDEX: 18.66 KG/M2 | SYSTOLIC BLOOD PRESSURE: 126 MMHG | WEIGHT: 126 LBS

## 2024-09-09 DIAGNOSIS — M54.16 LUMBAR RADICULOPATHY: Primary | ICD-10-CM

## 2024-09-09 DIAGNOSIS — M47.816 LUMBAR FACET ARTHROPATHY: ICD-10-CM

## 2024-09-09 DIAGNOSIS — M54.50 CHRONIC RIGHT-SIDED LOW BACK PAIN, UNSPECIFIED WHETHER SCIATICA PRESENT: ICD-10-CM

## 2024-09-09 DIAGNOSIS — M79.18 MYOFASCIAL PAIN SYNDROME: ICD-10-CM

## 2024-09-09 DIAGNOSIS — G89.29 CHRONIC RIGHT-SIDED LOW BACK PAIN, UNSPECIFIED WHETHER SCIATICA PRESENT: ICD-10-CM

## 2024-09-09 PROCEDURE — 99204 OFFICE O/P NEW MOD 45 MIN: CPT | Performed by: STUDENT IN AN ORGANIZED HEALTH CARE EDUCATION/TRAINING PROGRAM

## 2024-09-09 RX ORDER — CLINDAMYCIN HCL 300 MG
300 CAPSULE ORAL EVERY 6 HOURS
COMMUNITY
Start: 2024-09-04

## 2024-09-09 NOTE — PATIENT INSTRUCTIONS
"Patient Education     Epidural injection   The Basics   Written by the doctors and editors at [facility]   What is an epidural injection? -- An epidural injection can be used to treat a condition called \"radiculopathy.\" This is the medical term for the pain, weakness, numbness, or tingling that happens when nerves coming from the spinal cord get pinched or damaged.  The doctor injects medicines into the space outside the covering of the spinal cord (figure 1). This is similar to an \"epidural\" that is used for pain relief during labor and childbirth.  Epidural injections can be given into different parts of your back:   Cervical epidural injection - Used to help with pain in the head or arms.   Thoracic epidural injection - Used to help with pain in the upper or middle back.   Lumbar epidural injection - Used to help with pain in the lower back or legs.  How do I prepare for an epidural injection? -- The doctor or nurse will tell you if you need to do anything special to prepare. Before your procedure, your doctor will do an exam. They might send you to get tests, such as:   X-ray, ultrasound, or other imaging tests - Imaging tests create pictures of the inside of the body.  Your doctor will also ask you about your \"health history.\" This involves asking you questions about any health problems you have or had in the past, past surgeries, and any medicines you take. Tell them about:   Any medicines you are taking - This includes any prescription or \"over-the-counter\" medicines you use, plus any herbal supplements you take. It helps to write down and bring a list of any medicines you take, or bring a bag with all of your medicines with you.   Any allergies you have   Any bleeding problems you have - Certain medicines, including some herbs and supplements, can increase the risk of bleeding. Some health conditions also increase this risk.  You will also get information about:   Eating and drinking before your procedure - In " "some cases, you might need to \"fast\" before surgery. This means not eating or drinking anything for a period of time. In other cases, you might be allowed to have liquids until a short time before the procedure. Whether you need to fast, and for how long, depends on the procedure you are having.   What help you will need when you go home - For example, you might need to have someone else bring you home or stay with you for some time while you recover.  Ask the doctor or nurse if you have questions or if there is anything you do not understand.  What happens during an epidural injection? -- When it is time for the procedure:   You might get an \"IV,\" which is a thin tube that goes into a vein. This can be used to give you fluids and medicines.   You will get anesthesia medicines to numb the area where the doctor will give the injection. This is to make sure that you do not feel pain during the procedure. You might also get medicines to make you relax and feel sleepy, called \"sedatives.\"   The doctors and nurses will monitor your breathing, blood pressure, and heart rate during the procedure.   The doctor might use a continuous X-ray called \"fluoroscopy.\" This is to help make sure that the medicines are injected into the right place. The doctor might also inject a dye to see where to give the medicine.   The doctor will place a needle through your skin and inject the medicine into a space near your spine. Then, they will remove the needle and cover the area with a clean bandage.   The procedure takes 15 to 30 minutes.  What happens after an epidural injection? -- After your procedure, the staff will watch you closely for a short time. It might take a few days before you feel the effects of the epidural injection.  Before you go home, make sure that you know what problems to look out for and when to call the doctor. Make sure that you understand your doctor's or nurse's instructions. Ask questions about anything you do " "not understand.  For the rest of the day after your procedure:   Try to rest. Limit activities like exercise or driving.   The doctor might recommend an over-the-counter pain medicine. These include acetaminophen (sample brand name: Tylenol), ibuprofen (sample brand names: Advil, Motrin), and naproxen (sample brand name: Aleve).   Ice can help with pain and swelling. Put a cold gel pack, bag of ice, or bag of frozen vegetables on the injection site area every 1 to 2 hours, for 15 minutes each time. Put a thin towel between the ice (or other cold object) and the skin.  What are the risks of an epidural injection? -- Your doctor will talk to you about all of the possible risks, and answer your questions. Possible risks include:   Bleeding   Infection   Headache   Nerve injury  When should I call the doctor? -- Call for emergency help right away (in the [place] and [place], call 9-1-1) if:   You can't move your arms or legs.  Call for advice if:   You have a fever of 100.4°F (38°C) or higher, or chills.   You have redness or swelling around the injection site.   You have a headache.   Your arms or legs are numb, weak, or tingly.  All topics are updated as new evidence becomes available and our peer review process is complete.  This topic retrieved from [employer] on: May 15, 2024.  Topic 748953 Version 1.0  Release: 32.4.3 - C32.134  © 2024 UpToDate, Inc. and/or its affiliates. All rights reserved.  figure 1: Epidural injection     Duringan epidural injection, the doctor inserts a needle between 2 of the bones thatmake up the spine. Then, they inject medicines into the area around the spinalcord. This is an illustration of a \"lumbar\" epidural injection, which is given into the low back. The doctor can place the needle in other areas to treat other types of pain.  Graphic 629944 Version 1.0  Consumer Information Use and Disclaimer   Disclaimer: This generalized information is a limited summary of diagnosis, treatment, and/or " medication information. It is not meant to be comprehensive and should be used as a tool to help the user understand and/or assess potential diagnostic and treatment options. It does NOT include all information about conditions, treatments, medications, side effects, or risks that may apply to a specific patient. It is not intended to be medical advice or a substitute for the medical advice, diagnosis, or treatment of a health care provider based on the health care provider's examination and assessment of a patient's specific and unique circumstances. Patients must speak with a health care provider for complete information about their health, medical questions, and treatment options, including any risks or benefits regarding use of medications. This information does not endorse any treatments or medications as safe, effective, or approved for treating a specific patient. UpToDate, Inc. and its affiliates disclaim any warranty or liability relating to this information or the use thereof.The use of this information is governed by the Terms of Use, available at https://www.[employer].com/en/know/clinical-effectiveness-terms. 2024© UpToDate, Inc. and its affiliates and/or licensors. All rights reserved.  Copyright   © 2024 UpToDate, Inc. and/or its affiliates. All rights reserved.

## 2024-10-08 ENCOUNTER — TELEPHONE (OUTPATIENT)
Age: 64
End: 2024-10-08

## 2024-10-08 NOTE — TELEPHONE ENCOUNTER
Pt called to reschedule her 10/14/24 appointment, she stated she is not having any symptoms at this time and would like to push it out a few weeks due to having a lot of things going on. Pt rescheduled for 11/18/24.

## 2024-11-18 ENCOUNTER — CONSULT (OUTPATIENT)
Dept: UROLOGY | Facility: CLINIC | Age: 64
End: 2024-11-18
Payer: COMMERCIAL

## 2024-11-18 VITALS
BODY MASS INDEX: 18.22 KG/M2 | WEIGHT: 123 LBS | TEMPERATURE: 97.6 F | DIASTOLIC BLOOD PRESSURE: 86 MMHG | HEIGHT: 69 IN | SYSTOLIC BLOOD PRESSURE: 142 MMHG | HEART RATE: 83 BPM | OXYGEN SATURATION: 98 %

## 2024-11-18 DIAGNOSIS — N20.0 RENAL CALCULI: Primary | ICD-10-CM

## 2024-11-18 DIAGNOSIS — N20.0 KIDNEY STONES: ICD-10-CM

## 2024-11-18 LAB
BACTERIA UR QL AUTO: ABNORMAL /HPF
BILIRUB UR QL STRIP: NEGATIVE
CLARITY UR: CLEAR
COLOR UR: YELLOW
GLUCOSE UR STRIP-MCNC: NEGATIVE MG/DL
HGB UR QL STRIP.AUTO: ABNORMAL
KETONES UR STRIP-MCNC: NEGATIVE MG/DL
LEUKOCYTE ESTERASE UR QL STRIP: NEGATIVE
NITRITE UR QL STRIP: NEGATIVE
NON-SQ EPI CELLS URNS QL MICRO: ABNORMAL /HPF
PH UR STRIP.AUTO: 6 [PH]
PROT UR STRIP-MCNC: ABNORMAL MG/DL
RBC #/AREA URNS AUTO: ABNORMAL /HPF
SL AMB  POCT GLUCOSE, UA: NORMAL
SL AMB LEUKOCYTE ESTERASE,UA: NORMAL
SL AMB POCT BILIRUBIN,UA: NORMAL
SL AMB POCT BLOOD,UA: NORMAL
SL AMB POCT CLARITY,UA: CLEAR
SL AMB POCT COLOR,UA: YELLOW
SL AMB POCT KETONES,UA: NORMAL
SL AMB POCT NITRITE,UA: NORMAL
SL AMB POCT PH,UA: 5
SL AMB POCT SPECIFIC GRAVITY,UA: 1.02
SL AMB POCT URINE PROTEIN: NORMAL
SL AMB POCT UROBILINOGEN: 0.2
SP GR UR STRIP.AUTO: 1.02 (ref 1–1.03)
UROBILINOGEN UR STRIP-ACNC: <2 MG/DL
WBC #/AREA URNS AUTO: ABNORMAL /HPF

## 2024-11-18 PROCEDURE — 81002 URINALYSIS NONAUTO W/O SCOPE: CPT | Performed by: PHYSICIAN ASSISTANT

## 2024-11-18 PROCEDURE — 99203 OFFICE O/P NEW LOW 30 MIN: CPT | Performed by: PHYSICIAN ASSISTANT

## 2024-11-18 PROCEDURE — 81001 URINALYSIS AUTO W/SCOPE: CPT | Performed by: PHYSICIAN ASSISTANT

## 2024-11-18 NOTE — PROGRESS NOTES
2024      Chief Complaint   Patient presents with    Nephrolithiasis         Assessment and Plan    64 y.o. female new patient     Renal calculi  - CT renal stone study through [facility] from 24 - Multiple nonobstructing bilateral renal calculi.  No ureteral calculi identified.  No hydronephrosis or significant perinephric edema.   - Asymptomatic   - Urine dip today trace blood. Negative nitrites or leuks. Sent out for UA micro.   - Recommend adequate hydration and dietary modifications for stone prevention. Offered nephrology referral for metabolic evaluation which she declines at this.   - Will follow up in 2025 with KUB and US to reassess stone burden.       History of Present Illness  Joseph Mcbride is a 64 y.o. female here for new patient evaluation of kidney stones. Was seen at [facility] ED in April of this year with bilateral flank pain. CT showing above. She denies any flank pain, abdominal pain, urinary symptoms or hematuria. Denies any recent episodes of stone passage. She has had kidney stones in the past that required ureteroscopic intervention many years ago with urologist in [place].       Review of Systems   Constitutional:  Negative for chills and fever.   Respiratory:  Negative for shortness of breath.    Cardiovascular:  Negative for chest pain.   Gastrointestinal:  Negative for abdominal pain.   Genitourinary:  Negative for difficulty urinating, dysuria, flank pain, frequency, hematuria and urgency.   Neurological:  Negative for dizziness.         Past Medical History  Past Medical History:   Diagnosis Date    Anxiety     Diverticulitis     IBS (irritable bowel syndrome)     Kidney stones        Past Social History  Past Surgical History:   Procedure Laterality Date    LITHOTRIPSY      THERAPEUTIC        Social History     Tobacco Use   Smoking Status Every Day    Current packs/day: 0.25    Average packs/day: 0.3 packs/day for 40.9 years (10.2 ttl pk-yrs)    Types: Cigarettes     Start date: 1984    Passive exposure: Past   Smokeless Tobacco Never       Past Family History  Family History   Problem Relation Age of Onset    Hypertension Mother     No Known Problems Father     Glaucoma Maternal Grandmother     Diabetes Maternal Grandmother     Breast cancer Maternal Aunt        Past Social history  Social History     Socioeconomic History    Marital status: Single     Spouse name: Not on file    Number of children: Not on file    Years of education: Not on file    Highest education level: Not on file   Occupational History    Not on file   Tobacco Use    Smoking status: Every Day     Current packs/day: 0.25     Average packs/day: 0.3 packs/day for 40.9 years (10.2 ttl pk-yrs)     Types: Cigarettes     Start date: 1984     Passive exposure: Past    Smokeless tobacco: Never   Vaping Use    Vaping status: Never Used   Substance and Sexual Activity    Alcohol use: Never    Drug use: Never    Sexual activity: Not on file   Other Topics Concern    Not on file   Social History Narrative    Not on file     Social Drivers of Health     Financial Resource Strain: High Risk (4/22/2024)    Received from [employer]    Financial Resource Strain     Do you have any trouble paying for your medications, or do you think you might in the future?: Yes     Does your family have trouble paying for medicine? (Household - for ages 0-17 years): Not on file   Food Insecurity: Patient Declined (8/5/2024)    Nursing - Inadequate Food Risk Classification     Worried About Running Out of Food in the Last Year: Patient declined     Ran Out of Food in the Last Year: Patient declined     Ran Out of Food in the Last Year: Not on file   Transportation Needs: Patient Declined (8/5/2024)    PRAPARE - Transportation     Lack of Transportation (Medical): Patient declined     Lack of Transportation (Non-Medical): Patient declined   Physical Activity: Not on file   Stress: Not on file   Social Connections: Socially Integrated  "(4/22/2024)    Received from [employer]     How often do you feel lonely or isolated from those around you?: Sometimes   Intimate Partner Violence: Not on file   Housing Stability: Patient Declined (8/5/2024)    Housing Stability Vital Sign     Unable to Pay for Housing in the Last Year: Patient declined     Number of Times Moved in the Last Year: 2     Homeless in the Last Year: Patient declined       Current Medications  Current Outpatient Medications   Medication Sig Dispense Refill    chlorhexidine (PERIDEX) 0.12 % solution APPLY 15 ML TO THE MOUTH OR THROAT 2 (TWO) TIMES A DAY FOR 5 DAYS. SPIT OUT. DO NOT SWALLOW 473 mL 1    clindamycin (CLEOCIN) 300 MG capsule Take 300 mg by mouth every 6 (six) hours      cyclobenzaprine (FLEXERIL) 10 mg tablet  (Patient taking differently: As needed)      ibuprofen (MOTRIN) 800 mg tablet prn      ondansetron (ZOFRAN-ODT) 8 mg disintegrating tablet DISSOLVE 1 TABLET ON TONGUE EVERY 8 HOURS AS NEEDED FOR NAUSEA AND VOMITING (Patient taking differently: As needed.)      saccharomyces boulardii (FLORASTOR) 250 mg capsule Take 250 mg by mouth 2 (two) times a day      simethicone (MYLICON) 80 mg chewable tablet Chew 80 mg (Patient taking differently: Chew 80 mg As needed)       No current facility-administered medications for this visit.       Allergies  Allergies   Allergen Reactions    Codeine Confusion     Agitation         The following portions of the patient's history were reviewed and updated as appropriate: allergies, current medications, past medical history, past social history, past surgical history and problem list.      Vitals  Vitals:    11/18/24 1331   BP: 142/86   Patient Position: Sitting   Cuff Size: Large   Pulse: 83   Temp: 97.6 °F (36.4 °C)   TempSrc: Temporal   SpO2: 98%   Weight: 55.8 kg (123 lb)   Height: 5' 9\" (1.753 m)           Physical Exam  Physical Exam  Constitutional:       Appearance: Normal appearance.   HENT:      Head: " "Normocephalic and atraumatic.      Right Ear: External ear normal.      Left Ear: External ear normal.      Nose: Nose normal.   Eyes:      General: No scleral icterus.     Conjunctiva/sclera: Conjunctivae normal.   Cardiovascular:      Pulses: Normal pulses.   Pulmonary:      Effort: Pulmonary effort is normal.   Musculoskeletal:         General: Normal range of motion.      Cervical back: Normal range of motion.   Neurological:      General: No focal deficit present.      Mental Status: She is alert and oriented to person, place, and time.   Psychiatric:         Mood and Affect: Mood normal.         Behavior: Behavior normal.         Thought Content: Thought content normal.         Judgment: Judgment normal.           Results  Recent Results (from the past hour)   POCT urine dip    Collection Time: 11/18/24  1:31 PM   Result Value Ref Range    LEUKOCYTE ESTERASE,UA -     NITRITE,UA -     SL AMB POCT UROBILINOGEN 0.2     POCT URINE PROTEIN -      PH,UA 5.0     BLOOD,UA trace     SPECIFIC GRAVITY,UA 1.020     KETONES,UA -     BILIRUBIN,UA -     GLUCOSE, UA -      COLOR,UA Yellow     CLARITY,UA Clear    ]  No results found for: \"PSA\"  Lab Results   Component Value Date    CALCIUM 9.7 08/12/2024    K 4.5 08/12/2024    CO2 30 08/12/2024     08/12/2024    BUN 15 08/12/2024    CREATININE 0.63 08/12/2024     Lab Results   Component Value Date    WBC 8.75 08/12/2024    HGB 14.4 08/12/2024    HCT 45.9 08/12/2024    MCV 88 08/12/2024     08/12/2024           Orders  Orders Placed This Encounter   Procedures    POCT urine dip       Beverly Khan      "

## 2024-11-19 ENCOUNTER — RESULTS FOLLOW-UP (OUTPATIENT)
Dept: UROLOGY | Facility: CLINIC | Age: 64
End: 2024-11-19

## 2024-11-19 NOTE — TELEPHONE ENCOUNTER
Spoke with pt relaying Beverly JOSHI message  UA negative for significant RBCs. F/u as scheduled.   ----- Message from Beverly Khan PA-C sent at 11/19/2024  9:38 [phone]  EST -----  UA negative for significant RBCs. F/u as scheduled.

## 2025-06-05 ENCOUNTER — TELEPHONE (OUTPATIENT)
Dept: UROLOGY | Facility: CLINIC | Age: 65
End: 2025-06-05

## 2025-06-05 NOTE — TELEPHONE ENCOUNTER
Called and spoke to the PT. US and KUB is needed PTV with Beverly JOSHI on 6/9/25. PT stated not having any symptoms at this time and would like to cancel appt. Let the PT know will cancel appt and if the PT is experiencing any symptoms to give our office a call back. PT stated has the office number and appriciated the call.

## 2025-07-30 ENCOUNTER — TELEPHONE (OUTPATIENT)
Age: 65
End: 2025-07-30

## 2025-07-30 ENCOUNTER — OFFICE VISIT (OUTPATIENT)
Dept: FAMILY MEDICINE CLINIC | Facility: CLINIC | Age: 65
End: 2025-07-30
Payer: COMMERCIAL

## 2025-07-30 VITALS
SYSTOLIC BLOOD PRESSURE: 126 MMHG | HEART RATE: 75 BPM | TEMPERATURE: 97 F | BODY MASS INDEX: 18.37 KG/M2 | WEIGHT: 124 LBS | HEIGHT: 69 IN | OXYGEN SATURATION: 98 % | DIASTOLIC BLOOD PRESSURE: 80 MMHG

## 2025-07-30 DIAGNOSIS — E55.9 VITAMIN D DEFICIENCY: ICD-10-CM

## 2025-07-30 DIAGNOSIS — R53.83 FATIGUE DUE TO DEPRESSION: ICD-10-CM

## 2025-07-30 DIAGNOSIS — Z13.31 POSITIVE DEPRESSION SCREENING: ICD-10-CM

## 2025-07-30 DIAGNOSIS — M54.9 CHRONIC RIGHT-SIDED BACK PAIN, UNSPECIFIED BACK LOCATION: ICD-10-CM

## 2025-07-30 DIAGNOSIS — K58.8 OTHER IRRITABLE BOWEL SYNDROME: ICD-10-CM

## 2025-07-30 DIAGNOSIS — G89.29 CHRONIC RIGHT-SIDED BACK PAIN, UNSPECIFIED BACK LOCATION: ICD-10-CM

## 2025-07-30 DIAGNOSIS — M05.79 RHEUMATOID ARTHRITIS INVOLVING MULTIPLE SITES WITH POSITIVE RHEUMATOID FACTOR (HCC): ICD-10-CM

## 2025-07-30 DIAGNOSIS — Z59.9 FINANCIAL DIFFICULTIES: ICD-10-CM

## 2025-07-30 DIAGNOSIS — Z59.819 HOUSING INSECURITY: ICD-10-CM

## 2025-07-30 DIAGNOSIS — M41.9 SCOLIOSIS OF LUMBOSACRAL SPINE, UNSPECIFIED SCOLIOSIS TYPE: ICD-10-CM

## 2025-07-30 DIAGNOSIS — F32.A FATIGUE DUE TO DEPRESSION: ICD-10-CM

## 2025-07-30 DIAGNOSIS — K21.9 GASTROESOPHAGEAL REFLUX DISEASE WITHOUT ESOPHAGITIS: ICD-10-CM

## 2025-07-30 DIAGNOSIS — E78.00 ELEVATED LDL CHOLESTEROL LEVEL: ICD-10-CM

## 2025-07-30 DIAGNOSIS — F41.9 ANXIETY: Primary | ICD-10-CM

## 2025-07-30 PROCEDURE — G2211 COMPLEX E/M VISIT ADD ON: HCPCS | Performed by: NURSE PRACTITIONER

## 2025-07-30 PROCEDURE — 99214 OFFICE O/P EST MOD 30 MIN: CPT | Performed by: NURSE PRACTITIONER

## 2025-07-30 RX ORDER — ESCITALOPRAM OXALATE 5 MG/1
5 TABLET ORAL DAILY
Qty: 30 TABLET | Refills: 1 | Status: SHIPPED | OUTPATIENT
Start: 2025-07-30

## 2025-07-30 SDOH — ECONOMIC STABILITY - HOUSING INSECURITY: HOUSING INSTABILITY UNSPECIFIED: Z59.819

## 2025-07-30 SDOH — ECONOMIC STABILITY - INCOME SECURITY: PROBLEM RELATED TO HOUSING AND ECONOMIC CIRCUMSTANCES, UNSPECIFIED: Z59.9

## 2025-07-31 ENCOUNTER — PATIENT OUTREACH (OUTPATIENT)
Dept: CASE MANAGEMENT | Facility: OTHER | Age: 65
End: 2025-07-31

## 2025-08-04 ENCOUNTER — APPOINTMENT (OUTPATIENT)
Dept: LAB | Facility: CLINIC | Age: 65
End: 2025-08-04
Payer: COMMERCIAL

## 2025-08-04 DIAGNOSIS — F41.9 ANXIETY: Primary | ICD-10-CM

## 2025-08-04 LAB
25(OH)D3 SERPL-MCNC: 28 NG/ML (ref 30–100)
ALBUMIN SERPL BCG-MCNC: 4.3 G/DL (ref 3.5–5)
ALP SERPL-CCNC: 85 U/L (ref 34–104)
ALT SERPL W P-5'-P-CCNC: 16 U/L (ref 7–52)
ANION GAP SERPL CALCULATED.3IONS-SCNC: 9 MMOL/L (ref 4–13)
AST SERPL W P-5'-P-CCNC: 21 U/L (ref 13–39)
BASOPHILS # BLD AUTO: 0.04 THOUSANDS/ÂΜL (ref 0–0.1)
BASOPHILS NFR BLD AUTO: 0 % (ref 0–1)
BILIRUB SERPL-MCNC: 0.46 MG/DL (ref 0.2–1)
BILIRUB UR QL STRIP: NEGATIVE
BUN SERPL-MCNC: 15 MG/DL (ref 5–25)
CALCIUM SERPL-MCNC: 9.9 MG/DL (ref 8.4–10.2)
CHLORIDE SERPL-SCNC: 101 MMOL/L (ref 96–108)
CHOLEST SERPL-MCNC: 188 MG/DL (ref ?–200)
CLARITY UR: CLEAR
CO2 SERPL-SCNC: 31 MMOL/L (ref 21–32)
COLOR UR: COLORLESS
CREAT SERPL-MCNC: 0.68 MG/DL (ref 0.6–1.3)
EOSINOPHIL # BLD AUTO: 0.23 THOUSAND/ÂΜL (ref 0–0.61)
EOSINOPHIL NFR BLD AUTO: 2 % (ref 0–6)
ERYTHROCYTE [DISTWIDTH] IN BLOOD BY AUTOMATED COUNT: 15.2 % (ref 11.6–15.1)
GFR SERPL CREATININE-BSD FRML MDRD: 91 ML/MIN/1.73SQ M
GLUCOSE P FAST SERPL-MCNC: 76 MG/DL (ref 65–99)
GLUCOSE UR STRIP-MCNC: NEGATIVE MG/DL
HCT VFR BLD AUTO: 44.3 % (ref 34.8–46.1)
HDLC SERPL-MCNC: 59 MG/DL
HGB BLD-MCNC: 14.2 G/DL (ref 11.5–15.4)
HGB UR QL STRIP.AUTO: NEGATIVE
IMM GRANULOCYTES # BLD AUTO: 0.05 THOUSAND/UL (ref 0–0.2)
IMM GRANULOCYTES NFR BLD AUTO: 1 % (ref 0–2)
KETONES UR STRIP-MCNC: NEGATIVE MG/DL
LDLC SERPL CALC-MCNC: 112 MG/DL (ref 0–100)
LEUKOCYTE ESTERASE UR QL STRIP: NEGATIVE
LYMPHOCYTES # BLD AUTO: 2.44 THOUSANDS/ÂΜL (ref 0.6–4.47)
LYMPHOCYTES NFR BLD AUTO: 23 % (ref 14–44)
MCH RBC QN AUTO: 28.1 PG (ref 26.8–34.3)
MCHC RBC AUTO-ENTMCNC: 32.1 G/DL (ref 31.4–37.4)
MCV RBC AUTO: 88 FL (ref 82–98)
MONOCYTES # BLD AUTO: 0.63 THOUSAND/ÂΜL (ref 0.17–1.22)
MONOCYTES NFR BLD AUTO: 6 % (ref 4–12)
NEUTROPHILS # BLD AUTO: 7.08 THOUSANDS/ÂΜL (ref 1.85–7.62)
NEUTS SEG NFR BLD AUTO: 68 % (ref 43–75)
NITRITE UR QL STRIP: NEGATIVE
NONHDLC SERPL-MCNC: 129 MG/DL
NRBC BLD AUTO-RTO: 0 /100 WBCS
PH UR STRIP.AUTO: 7 [PH]
PLATELET # BLD AUTO: 255 THOUSANDS/UL (ref 149–390)
PMV BLD AUTO: 11.6 FL (ref 8.9–12.7)
POTASSIUM SERPL-SCNC: 4.1 MMOL/L (ref 3.5–5.3)
PROT SERPL-MCNC: 7.5 G/DL (ref 6.4–8.4)
PROT UR STRIP-MCNC: NEGATIVE MG/DL
RBC # BLD AUTO: 5.05 MILLION/UL (ref 3.81–5.12)
SODIUM SERPL-SCNC: 141 MMOL/L (ref 135–147)
SP GR UR STRIP.AUTO: 1 (ref 1–1.03)
TRIGL SERPL-MCNC: 87 MG/DL (ref ?–150)
TSH SERPL DL<=0.05 MIU/L-ACNC: 3.32 UIU/ML (ref 0.45–4.5)
UROBILINOGEN UR STRIP-ACNC: <2 MG/DL
WBC # BLD AUTO: 10.47 THOUSAND/UL (ref 4.31–10.16)

## 2025-08-04 PROCEDURE — 82306 VITAMIN D 25 HYDROXY: CPT | Performed by: NURSE PRACTITIONER

## 2025-08-04 PROCEDURE — 85025 COMPLETE CBC W/AUTO DIFF WBC: CPT | Performed by: NURSE PRACTITIONER

## 2025-08-04 PROCEDURE — 36415 COLL VENOUS BLD VENIPUNCTURE: CPT | Performed by: NURSE PRACTITIONER

## 2025-08-04 PROCEDURE — 80053 COMPREHEN METABOLIC PANEL: CPT | Performed by: NURSE PRACTITIONER

## 2025-08-04 PROCEDURE — 80061 LIPID PANEL: CPT | Performed by: NURSE PRACTITIONER

## 2025-08-04 PROCEDURE — 84443 ASSAY THYROID STIM HORMONE: CPT | Performed by: NURSE PRACTITIONER

## 2025-08-04 PROCEDURE — 81003 URINALYSIS AUTO W/O SCOPE: CPT

## 2025-08-05 ENCOUNTER — TELEPHONE (OUTPATIENT)
Dept: FAMILY MEDICINE CLINIC | Facility: CLINIC | Age: 65
End: 2025-08-05

## 2025-08-06 ENCOUNTER — OFFICE VISIT (OUTPATIENT)
Dept: PAIN MEDICINE | Facility: CLINIC | Age: 65
End: 2025-08-06
Payer: COMMERCIAL

## 2025-08-06 VITALS — WEIGHT: 124 LBS | BODY MASS INDEX: 18.37 KG/M2 | HEIGHT: 69 IN

## 2025-08-06 DIAGNOSIS — M51.360 DEGENERATION OF INTERVERTEBRAL DISC OF LUMBAR REGION WITH DISCOGENIC BACK PAIN: ICD-10-CM

## 2025-08-06 DIAGNOSIS — M79.18 MYOFASCIAL PAIN SYNDROME: ICD-10-CM

## 2025-08-06 DIAGNOSIS — G89.29 CHRONIC BILATERAL LOW BACK PAIN WITHOUT SCIATICA: ICD-10-CM

## 2025-08-06 DIAGNOSIS — M54.2 NECK PAIN: Primary | ICD-10-CM

## 2025-08-06 DIAGNOSIS — M54.50 CHRONIC BILATERAL LOW BACK PAIN WITHOUT SCIATICA: ICD-10-CM

## 2025-08-06 PROCEDURE — 99214 OFFICE O/P EST MOD 30 MIN: CPT | Performed by: STUDENT IN AN ORGANIZED HEALTH CARE EDUCATION/TRAINING PROGRAM

## 2025-08-06 RX ORDER — IBUPROFEN, PSEUDOEPHEDRINE HYDROCHLORIDE 200; 30 MG/1; MG/1
CAPSULE, LIQUID FILLED ORAL AS NEEDED
COMMUNITY
End: 2025-08-11

## 2025-08-06 RX ORDER — IBUPROFEN 800 MG/1
800 TABLET, FILM COATED ORAL DAILY PRN
Qty: 30 TABLET | Refills: 0 | Status: SHIPPED | OUTPATIENT
Start: 2025-08-06 | End: 2025-08-11

## 2025-08-11 ENCOUNTER — OFFICE VISIT (OUTPATIENT)
Dept: FAMILY MEDICINE CLINIC | Facility: CLINIC | Age: 65
End: 2025-08-11
Payer: COMMERCIAL

## 2025-08-11 ENCOUNTER — TELEPHONE (OUTPATIENT)
Age: 65
End: 2025-08-11

## 2025-08-19 ENCOUNTER — EVALUATION (OUTPATIENT)
Dept: PHYSICAL THERAPY | Facility: CLINIC | Age: 65
End: 2025-08-19
Attending: STUDENT IN AN ORGANIZED HEALTH CARE EDUCATION/TRAINING PROGRAM
Payer: COMMERCIAL

## 2025-08-19 DIAGNOSIS — G89.29 CHRONIC BILATERAL LOW BACK PAIN WITHOUT SCIATICA: ICD-10-CM

## 2025-08-19 DIAGNOSIS — M54.2 NECK PAIN: Primary | ICD-10-CM

## 2025-08-19 DIAGNOSIS — M51.360 DEGENERATION OF INTERVERTEBRAL DISC OF LUMBAR REGION WITH DISCOGENIC BACK PAIN: ICD-10-CM

## 2025-08-19 DIAGNOSIS — M54.50 CHRONIC BILATERAL LOW BACK PAIN WITHOUT SCIATICA: ICD-10-CM

## 2025-08-19 DIAGNOSIS — M79.18 MYOFASCIAL PAIN SYNDROME: ICD-10-CM

## 2025-08-19 PROCEDURE — 97161 PT EVAL LOW COMPLEX 20 MIN: CPT | Performed by: PHYSICAL THERAPIST

## 2025-08-19 PROCEDURE — 97110 THERAPEUTIC EXERCISES: CPT | Performed by: PHYSICAL THERAPIST

## 2025-08-19 PROCEDURE — 97112 NEUROMUSCULAR REEDUCATION: CPT | Performed by: PHYSICAL THERAPIST
